# Patient Record
Sex: MALE | Race: WHITE | URBAN - METROPOLITAN AREA
[De-identification: names, ages, dates, MRNs, and addresses within clinical notes are randomized per-mention and may not be internally consistent; named-entity substitution may affect disease eponyms.]

---

## 2017-10-25 ENCOUNTER — HISTORICAL (OUTPATIENT)
Dept: ADMINISTRATIVE | Facility: HOSPITAL | Age: 42
End: 2017-10-25

## 2017-10-25 LAB
ANION GAP SERPL CALC-SCNC: 13 MMOL/L
BUN SERPL-MCNC: 21 MG/DL (ref 7–18)
CHLORIDE SERPL-SCNC: 101 MMOL/L (ref 98–109)
CREAT SERPL-MCNC: 0.5 MG/DL (ref 0.6–1.3)
GLUCOSE SERPL-MCNC: 197 MG/DL (ref 70–105)
HCT VFR BLD CALC: 45 % (ref 38–51)
HGB BLD-MCNC: 15.3 MG/DL (ref 12–17)
POC IONIZED CALCIUM: 1.18 MMOL/L (ref 1.12–1.32)
POC TCO2: 32 MMOL/L (ref 22–27)
POTASSIUM BLD-SCNC: 3.8 MMOL/L (ref 3.5–4.9)
SODIUM BLD-SCNC: 142 MMOL/L (ref 138–146)

## 2018-03-08 ENCOUNTER — HISTORICAL (OUTPATIENT)
Dept: ADMINISTRATIVE | Facility: HOSPITAL | Age: 43
End: 2018-03-08

## 2020-09-25 ENCOUNTER — HISTORICAL (OUTPATIENT)
Dept: ADMINISTRATIVE | Facility: HOSPITAL | Age: 45
End: 2020-09-25

## 2020-10-19 ENCOUNTER — HISTORICAL (OUTPATIENT)
Dept: ADMINISTRATIVE | Facility: HOSPITAL | Age: 45
End: 2020-10-19

## 2020-10-19 LAB
ABS NEUT (OLG): 2.61 X10(3)/MCL (ref 2.1–9.2)
ALBUMIN SERPL-MCNC: 3.3 GM/DL (ref 3.5–5)
ALBUMIN/GLOB SERPL: 1 RATIO (ref 1.1–2)
ALP SERPL-CCNC: 163 UNIT/L (ref 40–150)
ALT SERPL-CCNC: 48 UNIT/L (ref 0–55)
APTT PPP: 27 SECOND(S) (ref 23.2–33.7)
AST SERPL-CCNC: 36 UNIT/L (ref 5–34)
BASOPHILS # BLD AUTO: 0 X10(3)/MCL (ref 0–0.2)
BASOPHILS NFR BLD AUTO: 1 %
BILIRUB SERPL-MCNC: 0.3 MG/DL
BILIRUBIN DIRECT+TOT PNL SERPL-MCNC: 0.1 MG/DL (ref 0–0.8)
BILIRUBIN DIRECT+TOT PNL SERPL-MCNC: 0.2 MG/DL (ref 0–0.5)
BUN SERPL-MCNC: 52.1 MG/DL (ref 8.9–20.6)
CALCIUM SERPL-MCNC: 7.9 MG/DL (ref 8.4–10.2)
CHLORIDE SERPL-SCNC: 109 MMOL/L (ref 98–107)
CO2 SERPL-SCNC: 21 MMOL/L (ref 22–29)
CREAT SERPL-MCNC: 1.23 MG/DL (ref 0.73–1.18)
EOSINOPHIL # BLD AUTO: 0.2 X10(3)/MCL (ref 0–0.9)
EOSINOPHIL NFR BLD AUTO: 3 %
ERYTHROCYTE [DISTWIDTH] IN BLOOD BY AUTOMATED COUNT: 14.6 % (ref 11.5–17)
GLOBULIN SER-MCNC: 3.2 GM/DL (ref 2.4–3.5)
GLUCOSE SERPL-MCNC: 250 MG/DL (ref 74–100)
HCT VFR BLD AUTO: 44.9 % (ref 42–52)
HGB BLD-MCNC: 13.7 GM/DL (ref 14–18)
INR PPP: 1 (ref 0–1.3)
LACTATE SERPL-SCNC: 1.4 MMOL/L (ref 0.5–2.2)
LYMPHOCYTES # BLD AUTO: 2.7 X10(3)/MCL (ref 0.6–4.6)
LYMPHOCYTES NFR BLD AUTO: 44 %
MCH RBC QN AUTO: 29.2 PG (ref 27–31)
MCHC RBC AUTO-ENTMCNC: 30.5 GM/DL (ref 33–36)
MCV RBC AUTO: 95.7 FL (ref 80–94)
MONOCYTES # BLD AUTO: 0.6 X10(3)/MCL (ref 0.1–1.3)
MONOCYTES NFR BLD AUTO: 9 %
NEUTROPHILS # BLD AUTO: 2.61 X10(3)/MCL (ref 2.1–9.2)
NEUTROPHILS NFR BLD AUTO: 42 %
PLATELET # BLD AUTO: 123 X10(3)/MCL (ref 130–400)
PMV BLD AUTO: ABNORMAL FL (ref 9.4–12.4)
POTASSIUM SERPL-SCNC: 5.9 MMOL/L (ref 3.5–5.1)
PROT SERPL-MCNC: 6.5 GM/DL (ref 6.4–8.3)
PROTHROMBIN TIME: 13.3 SECOND(S) (ref 11.1–13.7)
RBC # BLD AUTO: 4.69 X10(6)/MCL (ref 4.7–6.1)
SODIUM SERPL-SCNC: 137 MMOL/L (ref 136–145)
TROPONIN I SERPL-MCNC: <0.01 NG/ML (ref 0–0.04)
WBC # SPEC AUTO: 6.2 X10(3)/MCL (ref 4.5–11.5)

## 2020-10-20 LAB
ALBUMIN SERPL-MCNC: 3 GM/DL (ref 3.5–5)
ALBUMIN/GLOB SERPL: 0.9 RATIO (ref 1.1–2)
ALP SERPL-CCNC: 151 UNIT/L (ref 40–150)
ALT SERPL-CCNC: 45 UNIT/L (ref 0–55)
AST SERPL-CCNC: 38 UNIT/L (ref 5–34)
BILIRUB SERPL-MCNC: 0.6 MG/DL
BILIRUBIN DIRECT+TOT PNL SERPL-MCNC: 0.2 MG/DL (ref 0–0.5)
BILIRUBIN DIRECT+TOT PNL SERPL-MCNC: 0.4 MG/DL (ref 0–0.8)
BUN SERPL-MCNC: 30 MG/DL (ref 8.9–20.6)
CALCIUM SERPL-MCNC: 8.4 MG/DL (ref 8.4–10.2)
CHLORIDE SERPL-SCNC: 109 MMOL/L (ref 98–107)
CO2 SERPL-SCNC: 20 MMOL/L (ref 22–29)
CREAT SERPL-MCNC: 0.84 MG/DL (ref 0.73–1.18)
GLOBULIN SER-MCNC: 3.2 GM/DL (ref 2.4–3.5)
GLUCOSE SERPL-MCNC: 269 MG/DL (ref 74–100)
POTASSIUM SERPL-SCNC: 5.8 MMOL/L (ref 3.5–5.1)
PROT SERPL-MCNC: 6.2 GM/DL (ref 6.4–8.3)
SODIUM SERPL-SCNC: 138 MMOL/L (ref 136–145)

## 2020-10-22 LAB
BUN SERPL-MCNC: 21 MG/DL (ref 7–18)
CREAT SERPL-MCNC: 0.5 MG/DL (ref 0.6–1.3)
GLUCOSE SERPL-MCNC: 329 MG/DL (ref 74–106)

## 2020-10-24 LAB
FINAL CULTURE: NORMAL
FINAL CULTURE: NORMAL

## 2020-11-12 ENCOUNTER — HISTORICAL (OUTPATIENT)
Dept: ADMINISTRATIVE | Facility: HOSPITAL | Age: 45
End: 2020-11-12

## 2020-11-12 LAB
ABS NEUT (OLG): 2.37 X10(3)/MCL (ref 2.1–9.2)
ALBUMIN SERPL-MCNC: 3.7 GM/DL (ref 3.5–5)
ALBUMIN/GLOB SERPL: 1.1 RATIO (ref 1.1–2)
ALP SERPL-CCNC: 197 UNIT/L (ref 40–150)
ALT SERPL-CCNC: 484 UNIT/L (ref 0–55)
AST SERPL-CCNC: 309 UNIT/L (ref 5–34)
BASOPHILS # BLD AUTO: 0 X10(3)/MCL (ref 0–0.2)
BASOPHILS NFR BLD AUTO: 1 %
BILIRUB SERPL-MCNC: 0.8 MG/DL
BILIRUBIN DIRECT+TOT PNL SERPL-MCNC: 0.3 MG/DL (ref 0–0.8)
BILIRUBIN DIRECT+TOT PNL SERPL-MCNC: 0.5 MG/DL (ref 0–0.5)
BUN SERPL-MCNC: 19 MG/DL (ref 8.9–20.6)
CALCIUM SERPL-MCNC: 9 MG/DL (ref 8.4–10.2)
CHLORIDE SERPL-SCNC: 96 MMOL/L (ref 98–107)
CO2 SERPL-SCNC: 27 MMOL/L (ref 22–29)
CREAT SERPL-MCNC: 0.76 MG/DL (ref 0.73–1.18)
EOSINOPHIL # BLD AUTO: 0.2 X10(3)/MCL (ref 0–0.9)
EOSINOPHIL NFR BLD AUTO: 4 %
ERYTHROCYTE [DISTWIDTH] IN BLOOD BY AUTOMATED COUNT: 14.9 % (ref 11.5–14.5)
EST CREAT CLEARANCE SER (OHS): 114.47 ML/MIN
FERRITIN SERPL-MCNC: 304.64 NG/ML (ref 21.81–274.66)
GLOBULIN SER-MCNC: 3.5 GM/DL (ref 2.4–3.5)
GLUCOSE SERPL-MCNC: 457 MG/DL (ref 74–100)
HAV AB SER QL IA: REACTIVE
HBV CORE AB SERPL QL IA: NONREACTIVE
HBV SURFACE AB SER-ACNC: 0.47 M[IU]/ML
HBV SURFACE AB SERPL IA-ACNC: NONREACTIVE M[IU]/ML
HBV SURFACE AG SERPL QL IA: NONREACTIVE
HCT VFR BLD AUTO: 42.1 % (ref 40–51)
HCV AB SERPL QL IA: REACTIVE
HGB BLD-MCNC: 13.7 GM/DL (ref 13.5–17.5)
HIV 1+2 AB+HIV1 P24 AG SERPL QL IA: NONREACTIVE
IMM GRANULOCYTES # BLD AUTO: 0.02 10*3/UL
IMM GRANULOCYTES NFR BLD AUTO: 0 %
INR PPP: 0.94 (ref 0.9–1.2)
IRON SATN MFR SERPL: 54 % (ref 20–50)
IRON SERPL-MCNC: 152 UG/DL (ref 65–175)
LYMPHOCYTES # BLD AUTO: 2.4 X10(3)/MCL (ref 0.6–4.6)
LYMPHOCYTES NFR BLD AUTO: 42 %
MCH RBC QN AUTO: 28.8 PG (ref 26–34)
MCHC RBC AUTO-ENTMCNC: 32.5 GM/DL (ref 31–37)
MCV RBC AUTO: 88.6 FL (ref 80–100)
MONOCYTES # BLD AUTO: 0.6 X10(3)/MCL (ref 0.1–1.3)
MONOCYTES NFR BLD AUTO: 11 %
NEUTROPHILS # BLD AUTO: 2.37 X10(3)/MCL (ref 2.1–9.2)
NEUTROPHILS NFR BLD AUTO: 42 %
PLATELET # BLD AUTO: 161 X10(3)/MCL (ref 130–400)
PMV BLD AUTO: 13.1 FL (ref 7.4–10.4)
POTASSIUM SERPL-SCNC: 4.6 MMOL/L (ref 3.5–5.1)
PROT SERPL-MCNC: 7.2 GM/DL (ref 6.4–8.3)
PROTHROMBIN TIME: 12.1 SECOND(S) (ref 11.9–14.4)
RBC # BLD AUTO: 4.75 X10(6)/MCL (ref 4.5–5.9)
SODIUM SERPL-SCNC: 131 MMOL/L (ref 136–145)
T PALLIDUM AB SER QL: NONREACTIVE
T4 FREE SERPL-MCNC: 0.85 NG/DL (ref 0.7–1.48)
TIBC SERPL-MCNC: 129 UG/DL (ref 69–240)
TIBC SERPL-MCNC: 281 UG/DL (ref 250–450)
TRANSFERRIN SERPL-MCNC: 250 MG/DL (ref 174–364)
TSH SERPL-ACNC: 1.09 UIU/ML (ref 0.35–4.94)
WBC # SPEC AUTO: 5.6 X10(3)/MCL (ref 4.5–11)

## 2022-09-13 NOTE — HISTORICAL [facility]ER
This is a historical note converted from [employer]. Formatting and pictures may have been removed.  Please reference [employer] for original formatting and attached multimedia. Chief Complaint  chest and abdominal pain  History of Present Illness  Mr. Murillo is a 44 yr old male whose history includes DM, HTN and chronic hepatitis C. Presented to an outside ED with c/o chest and abdominal pain. Was hypotensive at 78/47 and received two liters of NS and started on Levophed. Transferred here for possible ICU admission. B/P prior to transfer was 103/66 and 116/78 upon arrival here. Levophed discontinued. Chest and abdominal pain have both been occurring intermittently for at least two years. Both chest and abdominal pain are generalized, occurs daily and last for minutes to several days at a time. Reports vomiting at least daily for the last week. Last BM two days ago. Unsure if hes ever had an EGD or colonoscopy. Denies use of OTC NSAIDS. Labs at outside facility include Na 130, K 5.1, bicarb 21.8, BUN 63, creatinine 1.8, troponin 0.02, CK 41, CKMB 2.0, and serum acetone negative. ABG with pH 7.41, pCO2 36, pO2 161 and HCO3 23. Stool negative for OCB. EKG shows NSR with nonspecific T wave abnormalities. Chest x-ray negative for acute findings. CT abdomen/pelvis show moderate constipation in proximal colon, moderate CAD in RCA with small pericardial effusion and chronic stage II avascular necrosis in right femoral head. Meds given prior to transfer include rocephin and protonix. B/P remains stable. No complaints at time of my exam. Asking for something to eat and drink. ?Had an outpatient cardiology appt today with Dr. ARITA to review testing that was recently done. Currently on antibiotics for a staph infection on his penis.  Review of Systems  As per HPI otherwise all systems reviewed and negative.?  Physical Exam  Vitals & Measurements  T:?36.8? ?C (Oral)? HR:?89(Peripheral)? HR:?88(Monitored)? RR:?16? BP:?122/73? SpO2:?100%?  WT:?77?kg?  ????GENERAL: awake, alert, oriented, in no distress, calm, cooperative  ????HEENT: PERRL, no scleral icterus, mucous membranes moist  ????NECK: no JVD, no bruits  ????LUNGS: clear bilateral, unlabored  ????HEART: rate regular, rhythm regular, no murmur, no edema  ????GI: soft, nontender, no distension, normal bowel sounds  ????MS: normal ROM, no obvious deformities  ????SKIN: warm, dry, intact  ????NEURO: cranial nerves 2-12 grossly intact, no obvious focal deficits  Assessment/Plan  Chronic abdominal pain - generalized  Chronic chest pain  Hypotension - resolved  Constipation  ASH  Hypertension  IDDM  Chronic hepatitis C  Hx ETOH and IV methamphetamine abuse  ?  PLAN:  - will consult cardiology, [facility]. Patient had an appt today to review testing that was done in the office.  - Repeat troponin pending  - EKG without concerning ST-T changes  - consider GI consult  - diabetic?diet and monitor CBGs  - Hold home B/P meds for now  - blood cultures pending  - zofran PRN  - avoid nephrotoxic meds  - continue NS at 125 ml/hr  - Lactulose 30g now and daily prn  - has an initial appt 11/5 at 0910 at [facility] for hep C  ?  ?  ????DVT PROPHYLAXIS: Ambulatory  ????CODE STATUS: Full  ????PCP: ?Roopa Medina NP  ?  I, Swati Louis NP, discussed this case with .  Please see addendum for further assessment and plan per MD.?   Problem List/Past Medical History  ??Past Medical History: HTN, Dyslipidemia, Hepatitis C, Chronic right hip pain secondary to avascular necrosis  ??Past Surgical History: Denies  ??Family History: Reviewed and none.?  ??Social History:??Smokes 1/2-1 PPD for last 25 years. Admits to regular use of marijuana. History of alcohol abuse and IV methamphetamine abuse but none in last 4 months.  Medications  Inpatient  Dextrose 50% and Water (50 mL vial/syringe), 12.5 gm= 25 mL, IV Push, Once, PRN  Dextrose 50% and Water (50 mL vial/syringe), 12.5 gm= 25 mL, IV Push, As Directed, PRN  Dextrose  50% and Water (50 mL vial/syringe), 25 gm= 50 mL, IV Push, As Directed, PRN  Dextrose 50% in Water intravenous solution, 12.5 gm= 25 mL, IV Push, As Directed, PRN  glucagon, 1 mg= 1 EA, IM, q10min, PRN  glucagon, 1 mg= 1 EA, IM, q10min, PRN  insulin lispro, 2-14 units, Subcutaneous, As Directed, PRN  NS (0.9% Sodium Chloride) Infusion 1,000 mL, 1000 mL, IV  Zofran 2 mg/mL injectable solution, 4 mg= 2 mL, IV Push, q4hr, PRN  Home  aspirin 81 mg oral Delayed Release (EC) tablet, 81 mg= 1 tab(s), Oral, Daily  atorvastatin 40 mg oral tablet, 40 mg= 1 tab(s), Oral, Daily  doxycycline hyclate 100 mg oral capsule, 100 mg= 1 cap(s), Oral, BID  gabapentin 300 mg oral capsule, 300 mg= 1 cap(s), Oral, TID  Humalog 100 U/mL (per 1 unit), See Instructions  Lantus 100 U/mL (per 1 unit), 60 units, Subcutaneous, Daily  Lasix 40 mg oral tablet, 40 mg= 1 tab(s), Oral, Daily  lisinopril 20 mg oral tablet, 20 mg= 1 tab(s), Oral, Daily  metoprolol succinate 50 mg oral capsule, extended release, 50 mg= 1 cap(s), Oral, Daily  sulfamethoxazole-trimethoprim DS tab 800 mg - 160 mg, 1 tablet, Oral, BID  Allergies  No Known Medication Allergies  Lab Results  Labs Last 24 Hours?  ?Chemistry? Hematology/Coagulation?   Lactic Acid Lvl: 1.4 mmol/L (10/19/20 07:31:00) PT: 13.3 second(s) (10/19/20 07:31:00)    INR: 1 (10/19/20 07:31:00)    PTT: 27 second(s) (10/19/20 07:31:00)    WBC: 6.2 x10(3)/mcL (10/19/20 07:31:00)    RBC:?4.69 x10(6)/mcL?Low (10/19/20 07:31:00)    Hgb:?13.7 gm/dL?Low (10/19/20 07:31:00)    Hct: 44.9 % (10/19/20 07:31:00)    Platelet:?123 x10(3)/mcL?Low (10/19/20 07:31:00)    MCV:?95.7 fL?High (10/19/20 07:31:00)    MCH: 29.2 pg (10/19/20 07:31:00)    MCHC:?30.5 gm/dL?Low (10/19/20 07:31:00)    RDW: 14.6 % (10/19/20 07:31:00)    MPV: NOTMEASURED. (10/19/20 07:31:00)    Abs Neut: 2.61 x10(3)/mcL (10/19/20 07:31:00)    Neutro Auto: 42 % (10/19/20 07:31:00)    Lymph Auto: 44 % (10/19/20 07:31:00)    Mono Auto: 9 % (10/19/20  07:31:00)    Eos Auto: 3 % (10/19/20 07:31:00)    Abs Eos: 0.2 x10(3)/mcL (10/19/20 07:31:00)    Basophil Auto: 1 % (10/19/20 07:31:00)    Abs Neutro: 2.61 x10(3)/mcL (10/19/20 07:31:00)    Abs Lymph: 2.7 x10(3)/mcL (10/19/20 07:31:00)    Abs Mono: 0.6 x10(3)/mcL (10/19/20 07:31:00)    Abs Baso: 0 x10(3)/mcL (10/19/20 07:31:00)   Diagnostic Results  ?  Radiology Report  XR Chest 1 View  ?  REASON FOR STUDY: Shortness of Breath  ?  COMPARISON: No relevant comparison studies available at the time of  dictation.  ?  FINDINGS: Normal cardiac silhouette. The lungs are well-inflated. No  consolidation identified. No significant pleural effusion or  discernible pneumothorax.  ?  IMPRESSION: No acute pulmonary process identified.  ?  ?  Signature Line  Electronically Signed By: Garfield Medel MD  Date/Time Signed: 10/19/2020 08:06  ?      For this patient encounter, I reviewed the NPs documentation, treatment plan, and medical decision making. ?I had face-to-face time with, and assumed care?this patient?on 10/19/2020. ?[TWA]  44 year old with a history of history of drug abuse and chronic pain.? Resume home Lantus, continue with ISS.? Will treat with IV fluids and antiemetics as needed.? Avoid narcotics.? Repeat labs in AM.

## 2022-09-13 NOTE — HISTORICAL [facility]
This is a historical note converted from [employer]. Formatting and pictures may have been removed.  Please reference [employer] for original formatting and attached multimedia. Admit and Discharge Dates  Admit Date: 10/19/2020  Discharge Date: 10/20/2020  Physicians  Attending Physician - Kristina WEISS, Mario Alberto  Admitting Physician - Kristina WEISS, Mario Alberto  Consulting Physician - Emily WEISS, Valorie ROSE  Primary Care Physician - No PCP, No  Discharge Diagnosis  1.?Acute hypotension?I95.9  2.?ASH (acute kidney injury)?N17.9  3.?Hyperkalemia?E87.5  4.?Chronic hepatitis C?B18.2  5.?Chronic abdominal pain?R10.9  6.?Type 2 diabetes mellitus with insulin therapy?E11.9  7.?Tobacco abuse?Z72.0  Surgical Procedures  No procedures recorded for this visit.  Immunizations  No immunizations recorded for this visit.  Hospital Course  Mr. Murillo is a 44 year old white male whose history includes DM, HTN and chronic hepatitis C. Presented to an outside ED with c/o chest and abdominal pain. Was hypotensive at 78/47 and received two liters of NS and started on Levophed. Transferred here for possible ICU admission. B/P prior to transfer was 103/66 and 116/78 upon arrival here. Levophed discontinued. Chest and abdominal pain have both been occurring intermittently for at least two years. Both chest and abdominal pain are generalized, occurs daily and last for minutes to several days at a time. Reports vomiting at least daily for the last week. Last BM two days ago. Unsure if hes ever had an EGD or colonoscopy. Denies use of OTC NSAIDS. Labs at outside facility include Na 130, K 5.1, bicarb 21.8, BUN 63, creatinine 1.8, troponin 0.02, CK 41, CKMB 2.0, and serum acetone negative. ABG with pH 7.41, pCO2 36, pO2 161 and HCO3 23. Stool negative for OCB. EKG shows NSR with nonspecific T wave abnormalities. Chest x-ray negative for acute findings. CT abdomen/pelvis showed moderate constipation in proximal colon, moderate CAD in RCA with small pericardial effusion  and chronic stage II avascular necrosis in right femoral head. Meds given prior to transfer include Rocephin and Protonix. B/P remains stable. No complaints at time of exam. Asking for something to eat and drink. ?Had an outpatient cardiology appt today with [facility] to review testing that was recently done. Currently on antibiotics for a staphylococcal infection on his penis.? Pt is asymptomatic, tolerating a solid diet, and requesting discharge.? he will be given a dose of Kayexalate and then discharged home.  Time Spent on discharge  35 minutes  Objective  Vitals & Measurements  T:?36.7? ?C (Oral)? TMIN:?36.5? ?C (Oral)? TMAX:?36.7? ?C (Oral)? HR:?81(Peripheral)? RR:?17? BP:?143/94? SpO2:?99%? WT:?77?kg? BMI:?23.77?  Physical Exam  General:?well-developed well-nourished in no acute distress  Eye: PERRLA, EOMI, clear conjunctiva, eyelids normal  HEENT:?oropharynx without erythema/exudate, oropharynx and nasal mucosal surfaces moist  Neck: full range of motion, no thyromegaly or lymphadenopathy  Respiratory:?clear to auscultation bilaterally  Cardiovascular:?regular rate and rhythm without murmurs, gallops or rubs  Gastrointestinal:?soft, non-tender, non-distended with normal bowel sounds, without masses to palpation  Musculoskeletal:?full range of motion of all extremities without limitation or discomfort  Neurologic: cranial nerves grossly intact, no signs of peripheral neurological deficit, motor/sensory function intact  Psychiatric: Cooperative  ?  Patient Discharge Condition  stable  Discharge Disposition  home   Discharge Medication Reconciliation  Prescribed  nitroglycerin (nitroglycerin 0.4 mg sublingual TAB)?0.4 mg, SL, q5min, PRN as needed for chest pain  Continue  aspirin (aspirin 81 mg oral Delayed Release (EC) tablet)?81 mg, Oral, Daily  atorvastatin (atorvastatin 40 mg oral tablet)?40 mg, Oral, Daily  diphenhydrAMINE (Diphedryl 25 mg oral capsule)?25 mg, Oral, q4hr, PRN allergy symptoms  doxycycline  (doxycycline hyclate 100 mg oral capsule)?100 mg, Oral, BID  gabapentin (gabapentin 300 mg oral capsule)?300 mg, Oral, TID  insulin glargine (Lantus 100 U/mL (per 1 unit))?60 units, Subcutaneous, Once a day (at bedtime)  insulin lispro (Humalog 100 U/mL (per 1 unit))?See Instructions  metoprolol (metoprolol succinate 50 mg oral capsule, extended release)?50 mg, Oral, Daily  Discontinue  furosemide (Lasix 40 mg oral tablet)?40 mg, Oral, Daily  insulin glargine (Lantus 100 U/mL (per 1 unit))?60 units, Subcutaneous, Daily  lisinopril (lisinopril 20 mg oral tablet)?20 mg, Oral, Daily  sulfamethoxazole-trimethoprim (sulfamethoxazole-trimethoprim DS tab 800 mg - 160 mg)?1 tablet, Oral, BID  Education and Orders Provided  Discharge - 10/20/20 11:05:00 CDT, Home?  Discharge Activity - Activity as Tolerated?  Discharge Diet - Diabetic?  Follow up  PCP  Reschedule follow up with [facility]  Car Seat Challenge  No Qualifying Data

## 2022-09-19 ENCOUNTER — HISTORICAL (OUTPATIENT)
Dept: ADMINISTRATIVE | Facility: HOSPITAL | Age: 47
End: 2022-09-19